# Patient Record
Sex: FEMALE | Race: ASIAN | NOT HISPANIC OR LATINO | URBAN - METROPOLITAN AREA
[De-identification: names, ages, dates, MRNs, and addresses within clinical notes are randomized per-mention and may not be internally consistent; named-entity substitution may affect disease eponyms.]

---

## 2018-07-26 ENCOUNTER — EMERGENCY (EMERGENCY)
Facility: HOSPITAL | Age: 63
LOS: 1 days | Discharge: HOME | End: 2018-07-26
Attending: EMERGENCY MEDICINE
Payer: COMMERCIAL

## 2018-07-26 VITALS
OXYGEN SATURATION: 96 % | SYSTOLIC BLOOD PRESSURE: 180 MMHG | TEMPERATURE: 98 F | DIASTOLIC BLOOD PRESSURE: 90 MMHG | RESPIRATION RATE: 17 BRPM | HEART RATE: 90 BPM

## 2018-07-26 VITALS
OXYGEN SATURATION: 97 % | SYSTOLIC BLOOD PRESSURE: 147 MMHG | HEART RATE: 61 BPM | RESPIRATION RATE: 16 BRPM | DIASTOLIC BLOOD PRESSURE: 84 MMHG | TEMPERATURE: 98 F

## 2018-07-26 LAB
ALBUMIN SERPL ELPH-MCNC: 4.6 G/DL — SIGNIFICANT CHANGE UP (ref 3.3–5)
ALP SERPL-CCNC: 88 U/L — SIGNIFICANT CHANGE UP (ref 40–120)
ALT FLD-CCNC: 45 U/L — SIGNIFICANT CHANGE UP (ref 10–45)
ANION GAP SERPL CALC-SCNC: 13 MMOL/L — SIGNIFICANT CHANGE UP (ref 5–17)
APTT BLD: 38.6 SEC — HIGH (ref 27.5–37.4)
AST SERPL-CCNC: 31 U/L — SIGNIFICANT CHANGE UP (ref 10–40)
BASOPHILS # BLD AUTO: 0.1 K/UL — SIGNIFICANT CHANGE UP (ref 0–0.2)
BASOPHILS NFR BLD AUTO: 0.9 % — SIGNIFICANT CHANGE UP (ref 0–2)
BILIRUB SERPL-MCNC: 1.5 MG/DL — HIGH (ref 0.2–1.2)
BUN SERPL-MCNC: 11 MG/DL — SIGNIFICANT CHANGE UP (ref 7–23)
CALCIUM SERPL-MCNC: 10 MG/DL — SIGNIFICANT CHANGE UP (ref 8.4–10.5)
CHLORIDE SERPL-SCNC: 103 MMOL/L — SIGNIFICANT CHANGE UP (ref 96–108)
CO2 SERPL-SCNC: 28 MMOL/L — SIGNIFICANT CHANGE UP (ref 22–31)
CREAT SERPL-MCNC: 0.6 MG/DL — SIGNIFICANT CHANGE UP (ref 0.5–1.3)
EOSINOPHIL # BLD AUTO: 0.2 K/UL — SIGNIFICANT CHANGE UP (ref 0–0.5)
EOSINOPHIL NFR BLD AUTO: 2.7 % — SIGNIFICANT CHANGE UP (ref 0–6)
GLUCOSE SERPL-MCNC: 113 MG/DL — HIGH (ref 70–99)
HCT VFR BLD CALC: 45.4 % — HIGH (ref 34.5–45)
HGB BLD-MCNC: 15.2 G/DL — SIGNIFICANT CHANGE UP (ref 11.5–15.5)
INR BLD: 1.04 RATIO — SIGNIFICANT CHANGE UP (ref 0.88–1.16)
LYMPHOCYTES # BLD AUTO: 1.9 K/UL — SIGNIFICANT CHANGE UP (ref 1–3.3)
LYMPHOCYTES # BLD AUTO: 34.7 % — SIGNIFICANT CHANGE UP (ref 13–44)
MCHC RBC-ENTMCNC: 29.8 PG — SIGNIFICANT CHANGE UP (ref 27–34)
MCHC RBC-ENTMCNC: 33.5 GM/DL — SIGNIFICANT CHANGE UP (ref 32–36)
MCV RBC AUTO: 89.1 FL — SIGNIFICANT CHANGE UP (ref 80–100)
MONOCYTES # BLD AUTO: 0.3 K/UL — SIGNIFICANT CHANGE UP (ref 0–0.9)
MONOCYTES NFR BLD AUTO: 5.8 % — SIGNIFICANT CHANGE UP (ref 2–14)
NEUTROPHILS # BLD AUTO: 3.1 K/UL — SIGNIFICANT CHANGE UP (ref 1.8–7.4)
NEUTROPHILS NFR BLD AUTO: 55.8 % — SIGNIFICANT CHANGE UP (ref 43–77)
PLATELET # BLD AUTO: 334 K/UL — SIGNIFICANT CHANGE UP (ref 150–400)
POTASSIUM SERPL-MCNC: 4.1 MMOL/L — SIGNIFICANT CHANGE UP (ref 3.5–5.3)
POTASSIUM SERPL-SCNC: 4.1 MMOL/L — SIGNIFICANT CHANGE UP (ref 3.5–5.3)
PROT SERPL-MCNC: 8.1 G/DL — SIGNIFICANT CHANGE UP (ref 6–8.3)
PROTHROM AB SERPL-ACNC: 11.4 SEC — SIGNIFICANT CHANGE UP (ref 9.8–12.7)
RBC # BLD: 5.09 M/UL — SIGNIFICANT CHANGE UP (ref 3.8–5.2)
RBC # FLD: 12.3 % — SIGNIFICANT CHANGE UP (ref 10.3–14.5)
SODIUM SERPL-SCNC: 144 MMOL/L — SIGNIFICANT CHANGE UP (ref 135–145)
WBC # BLD: 5.5 K/UL — SIGNIFICANT CHANGE UP (ref 3.8–10.5)
WBC # FLD AUTO: 5.5 K/UL — SIGNIFICANT CHANGE UP (ref 3.8–10.5)

## 2018-07-26 PROCEDURE — 80053 COMPREHEN METABOLIC PANEL: CPT

## 2018-07-26 PROCEDURE — 99284 EMERGENCY DEPT VISIT MOD MDM: CPT | Mod: 25

## 2018-07-26 PROCEDURE — 99284 EMERGENCY DEPT VISIT MOD MDM: CPT

## 2018-07-26 PROCEDURE — 85027 COMPLETE CBC AUTOMATED: CPT

## 2018-07-26 PROCEDURE — 96375 TX/PRO/DX INJ NEW DRUG ADDON: CPT

## 2018-07-26 PROCEDURE — 85610 PROTHROMBIN TIME: CPT

## 2018-07-26 PROCEDURE — 96361 HYDRATE IV INFUSION ADD-ON: CPT

## 2018-07-26 PROCEDURE — 85730 THROMBOPLASTIN TIME PARTIAL: CPT

## 2018-07-26 PROCEDURE — 96365 THER/PROPH/DIAG IV INF INIT: CPT

## 2018-07-26 PROCEDURE — 70450 CT HEAD/BRAIN W/O DYE: CPT | Mod: 26

## 2018-07-26 PROCEDURE — 70450 CT HEAD/BRAIN W/O DYE: CPT

## 2018-07-26 RX ORDER — ACETAMINOPHEN 500 MG
1000 TABLET ORAL ONCE
Qty: 0 | Refills: 0 | Status: COMPLETED | OUTPATIENT
Start: 2018-07-26 | End: 2018-07-26

## 2018-07-26 RX ORDER — DIPHENHYDRAMINE HCL 50 MG
25 CAPSULE ORAL ONCE
Qty: 0 | Refills: 0 | Status: COMPLETED | OUTPATIENT
Start: 2018-07-26 | End: 2018-07-26

## 2018-07-26 RX ORDER — SODIUM CHLORIDE 9 MG/ML
1000 INJECTION INTRAMUSCULAR; INTRAVENOUS; SUBCUTANEOUS ONCE
Qty: 0 | Refills: 0 | Status: COMPLETED | OUTPATIENT
Start: 2018-07-26 | End: 2018-07-26

## 2018-07-26 RX ORDER — DEXAMETHASONE 0.5 MG/5ML
10 ELIXIR ORAL ONCE
Qty: 0 | Refills: 0 | Status: COMPLETED | OUTPATIENT
Start: 2018-07-26 | End: 2018-07-26

## 2018-07-26 RX ORDER — KETOROLAC TROMETHAMINE 30 MG/ML
30 SYRINGE (ML) INJECTION ONCE
Qty: 0 | Refills: 0 | Status: DISCONTINUED | OUTPATIENT
Start: 2018-07-26 | End: 2018-07-26

## 2018-07-26 RX ORDER — METOCLOPRAMIDE HCL 10 MG
10 TABLET ORAL ONCE
Qty: 0 | Refills: 0 | Status: COMPLETED | OUTPATIENT
Start: 2018-07-26 | End: 2018-07-26

## 2018-07-26 RX ADMIN — Medication 400 MILLIGRAM(S): at 18:53

## 2018-07-26 RX ADMIN — Medication 30 MILLIGRAM(S): at 20:44

## 2018-07-26 RX ADMIN — Medication 30 MILLIGRAM(S): at 20:04

## 2018-07-26 RX ADMIN — Medication 10 MILLIGRAM(S): at 18:43

## 2018-07-26 RX ADMIN — Medication 10 MILLIGRAM(S): at 21:07

## 2018-07-26 RX ADMIN — SODIUM CHLORIDE 1000 MILLILITER(S): 9 INJECTION INTRAMUSCULAR; INTRAVENOUS; SUBCUTANEOUS at 18:43

## 2018-07-26 RX ADMIN — Medication 1000 MILLIGRAM(S): at 20:00

## 2018-07-26 RX ADMIN — Medication 25 MILLIGRAM(S): at 18:43

## 2018-07-26 RX ADMIN — SODIUM CHLORIDE 1000 MILLILITER(S): 9 INJECTION INTRAMUSCULAR; INTRAVENOUS; SUBCUTANEOUS at 20:30

## 2018-07-26 NOTE — ED PROVIDER NOTE - CARE PLAN
Principal Discharge DX:	Migraine  Assessment and plan of treatment:	Stay well hydrated.   Take motrin 600mg every 8 hours as needed for pain. Take with food. May alternate with tylenol 650mg every 6 hours as needed.   Follow up with your PCP and Neurologist upon discharge. Referral list provided.   Bring copy of results with you.  Return to ER for any worsening pain, vision changes, numbness, tingling, weakness, vomiting, passing out, or any other concerning symptoms.

## 2018-07-26 NOTE — ED PROVIDER NOTE - OBJECTIVE STATEMENT
63yo F with PMH HTN, seasonal allergies 61yo F with PMH HTN, seasonal allergies, presenting with HA x 11 days. Pt reports sudden onset of HA on 7/15, described as throbbing in the front and back of her head (both sides). Pt reports HA has been 8/10 in severity since onset, however HA was intermittent all week until today now has been constant. Reports associated photophobia an lightheadedness. Pt took tylenol with no relief, last took this AM. Reports previous headaches were similar in severity but have never lasted this long. Pt saw PCP and was told to go for CT head tomorrow, however pt reports pain was too bad so went to  and sent to ED. Also told her BP was high at . Denies any head injury, fever/chills, vision changes, neck pain, CP, cough, SOB, abd pain, n/v, numbness/tingling/weakness, room spinning dizziness, blood thinner use, smoking. + FH of CVA in father.     PCP 63yo F with PMH HTN, seasonal allergies, presenting with HA x 11 days. Pt's family at bedside assisting with Cantonese translation per patient request. Pt reports sudden onset of HA on 7/15, described as throbbing in the front and back of her head (both sides). Pt reports HA has been 8/10 in severity since onset, however HA was intermittent all week until today now has been constant. Reports associated photophobia, lightheadedness, and unsteady gait. Pt took tylenol with no relief, last took this AM. Reports previous headaches were similar in severity but have never lasted this long. Pt saw PCP and was told to go for CT head tomorrow, however pt reports pain was too bad so went to  and sent to ED. Also told her BP was high at . Denies any head injury, fever/chills, vision changes, ear pain/ tinnitus neck pain, CP, cough, SOB, abd pain, n/v, numbness/tingling/weakness, room spinning dizziness, blood thinner use, smoking. + FH of CVA in father.     PCP 61yo F with PMH HTN, seasonal allergies, presenting with HA x 11 days. Pt's family at bedside assisting with Cantonese translation per patient request. Pt reports sudden onset of HA on 7/15, described as throbbing in the front and back of her head (both sides). Pt reports HA has been 8/10 in severity since onset, however HA was intermittent all week until today now has been constant. Reports associated photophobia, lightheadedness, and unsteady gait. Pt took tylenol with no relief, last took this AM. Reports previous headaches were similar in severity but have never lasted this long, never dx with migraines. Pt saw PCP and was told to go for CT head tomorrow, however pt reports pain was too bad so went to  and sent to ED. Also told her BP was high at . Denies any head injury, fever/chills, vision changes, ear pain/ tinnitus neck pain, CP, cough, SOB, abd pain, n/v, numbness/tingling/weakness, room spinning dizziness, blood thinner use, smoking. + FH of CVA in father.     PCP 63yo F with PMH HTN, seasonal allergies, presenting with HA x 11 days. Pt's family at bedside assisting with Cantonese translation per patient request. Pt reports sudden onset of HA on 7/15, described as throbbing in the front and back of her head (both sides). Pt reports HA has been 8/10 in severity since onset, however HA was intermittent all week until today now has been constant. Reports associated photophobia, lightheadedness, and unsteady gait. Pt took tylenol with no relief, last took this AM. Reports previous headaches were similar in severity but have never lasted this long, never dx with migraines. Pt saw PCP and was told to go for CT head tomorrow, however pt reports pain was too bad so went to  and sent to ED. Also told her BP was high at . Denies any head injury, fever/chills, vision changes, ear pain/ tinnitus neck pain, CP, cough, SOB, abd pain, n/v, numbness/tingling/weakness, room spinning dizziness, blood thinner use, smoking. + FH of CVA in father.     PCP    [place] female presents with headache for about 10 days.  States headaches come on when she is stressed and are associated with light sensitivity.  Pt has suffered from migraine headaches before.

## 2018-07-26 NOTE — ED ADULT TRIAGE NOTE - CHIEF COMPLAINT QUOTE
pt sent by urgent care for symptomatic htn including photosensitivity and h/a.  pt denies any n/v or dizziness at this time.

## 2018-07-26 NOTE — ED PROVIDER NOTE - MEDICAL DECISION MAKING DETAILS
headache for 10 days.  Seems benign in nature.  Will get CT head to evaluate for mass or lesion, treat pain, and reassess.

## 2018-07-26 NOTE — ED PROVIDER NOTE - PLAN OF CARE
Stay well hydrated.   Take motrin 600mg every 8 hours as needed for pain. Take with food. May alternate with tylenol 650mg every 6 hours as needed.   Follow up with your PCP and Neurologist upon discharge. Referral list provided.   Bring copy of results with you.  Return to ER for any worsening pain, vision changes, numbness, tingling, weakness, vomiting, passing out, or any other concerning symptoms.

## 2018-07-26 NOTE — ED PROVIDER NOTE - PROGRESS NOTE DETAILS
Labs and CTH unremarkable. Pt reports HA improved to 6/10. Will give toradol and re-assess. - Charlotte Jimenez PA-C Pt reports she is feeling much better. Will give dose of decadron and d/c with neuro f/u. D/w Dr. Melgar. - TAMARA RiveraC

## 2018-07-26 NOTE — ED ADULT NURSE NOTE - OBJECTIVE STATEMENT
63 yo female A&OX3 presents to the ED with the c/o headache since Brandi 15. Pt states that she has been having headaches with nausea, no fevers or chills. Denies nausea and vomiting, no blurry vision. Pt states that headache is in the front of her head. Neuro intact, no change in LOC or photophobia. Pt denies hx of migraine. PERRL. Pt denies any recent head trauma. Lungs clear, equal, no sob or cough. Abd round, soft non tender and non distended. MAEX4

## 2021-05-17 NOTE — ED ADULT NURSE NOTE - CHIEF COMPLAINT
[FreeTextEntry1] : Patient likely with viral pharyngitis. Rapid strep preformed in office is negative. Will send throat culture to rule out strep. Recommend supportive care with antipyretics, salt water gargles, and if age-appropriate throat lozenges.\par \par Avoid exposure to environmental allergens. Wash hands and clothing after being outdoors. Recommend supportive care with oral long-acting antihistamine daily. Use nasal saline 2-3 times daily. For nasal congestion may use nasal steroid daily. Possible side effect of nasal steroid includes but not limited to thinning of nasal mucosa.\par 
The patient is a 62y Female complaining of hypertension.
